# Patient Record
Sex: FEMALE | Race: ASIAN | NOT HISPANIC OR LATINO | URBAN - METROPOLITAN AREA
[De-identification: names, ages, dates, MRNs, and addresses within clinical notes are randomized per-mention and may not be internally consistent; named-entity substitution may affect disease eponyms.]

---

## 2024-08-29 ENCOUNTER — OUTPATIENT (OUTPATIENT)
Dept: OUTPATIENT SERVICES | Facility: HOSPITAL | Age: 74
LOS: 1 days | End: 2024-08-29
Payer: MEDICAID

## 2024-08-29 VITALS
RESPIRATION RATE: 19 BRPM | TEMPERATURE: 98 F | OXYGEN SATURATION: 100 % | DIASTOLIC BLOOD PRESSURE: 78 MMHG | HEIGHT: 56 IN | HEART RATE: 55 BPM | SYSTOLIC BLOOD PRESSURE: 152 MMHG | WEIGHT: 102.29 LBS

## 2024-08-29 VITALS
RESPIRATION RATE: 23 BRPM | SYSTOLIC BLOOD PRESSURE: 137 MMHG | OXYGEN SATURATION: 98 % | HEART RATE: 58 BPM | DIASTOLIC BLOOD PRESSURE: 75 MMHG

## 2024-08-29 DIAGNOSIS — H25.89 OTHER AGE-RELATED CATARACT: ICD-10-CM

## 2024-08-29 PROCEDURE — C1889: CPT

## 2024-08-29 PROCEDURE — 66984 XCAPSL CTRC RMVL W/O ECP: CPT | Mod: RT

## 2024-08-29 DEVICE — LASER PROBE 25G CONSTELLATION: Type: IMPLANTABLE DEVICE | Site: RIGHT | Status: FUNCTIONAL

## 2024-08-29 RX ORDER — METOPROLOL TARTRATE 100 MG/1
1 TABLET ORAL
Refills: 0 | DISCHARGE

## 2024-08-29 RX ORDER — AMLODIPINE BESYLATE 10 MG/1
1 TABLET ORAL
Refills: 0 | DISCHARGE

## 2024-08-29 RX ORDER — DONEPEZIL HYDROCHLORIDE 5 MG/1
1 TABLET, FILM COATED ORAL
Refills: 0 | DISCHARGE

## 2024-08-29 NOTE — ASU DISCHARGE PLAN (ADULT/PEDIATRIC) - ASU DC SPECIAL INSTRUCTIONSFT
Continue Home Medication Regimen as per your Primary Care Provider    Keep patch and shield on    Follow up in the Retina Surgeon's office tomorrow

## 2024-08-29 NOTE — ASU DISCHARGE PLAN (ADULT/PEDIATRIC) - CARE PROVIDER_API CALL
Evan Washington  Ophthalmology  [address] [place] [address], [address]  [place] [address]  Phone: [phone]  Fax: [phone]  Scheduled Appointment: 08/30/2024 09:30 AM

## 2024-11-14 ENCOUNTER — APPOINTMENT (OUTPATIENT)
Dept: OPHTHALMOLOGY | Facility: CLINIC | Age: 74
End: 2024-11-14

## 2024-12-18 PROBLEM — F03.90 UNSPECIFIED DEMENTIA, UNSPECIFIED SEVERITY, WITHOUT BEHAVIORAL DISTURBANCE, PSYCHOTIC DISTURBANCE, MOOD DISTURBANCE, AND ANXIETY: Chronic | Status: ACTIVE | Noted: 2024-08-29

## 2024-12-18 PROBLEM — I10 ESSENTIAL (PRIMARY) HYPERTENSION: Chronic | Status: ACTIVE | Noted: 2024-08-29

## 2025-01-02 ENCOUNTER — OUTPATIENT (OUTPATIENT)
Dept: OUTPATIENT SERVICES | Facility: HOSPITAL | Age: 75
LOS: 1 days | End: 2025-01-02
Payer: MEDICAID

## 2025-01-02 VITALS
TEMPERATURE: 98 F | DIASTOLIC BLOOD PRESSURE: 70 MMHG | HEART RATE: 62 BPM | SYSTOLIC BLOOD PRESSURE: 144 MMHG | WEIGHT: 106.26 LBS | HEIGHT: 55.5 IN | OXYGEN SATURATION: 100 % | RESPIRATION RATE: 18 BRPM

## 2025-01-02 VITALS
OXYGEN SATURATION: 99 % | RESPIRATION RATE: 15 BRPM | DIASTOLIC BLOOD PRESSURE: 61 MMHG | SYSTOLIC BLOOD PRESSURE: 119 MMHG | HEART RATE: 82 BPM

## 2025-01-02 DIAGNOSIS — Z98.49 CATARACT EXTRACTION STATUS, UNSPECIFIED EYE: Chronic | ICD-10-CM

## 2025-01-02 DIAGNOSIS — H25.89 OTHER AGE-RELATED CATARACT: ICD-10-CM

## 2025-01-02 PROCEDURE — 66986 EXCHANGE LENS PROSTHESIS: CPT | Mod: LT

## 2025-01-02 PROCEDURE — 67036 REMOVAL OF INNER EYE FLUID: CPT | Mod: AS,LT

## 2025-01-02 PROCEDURE — 67036 REMOVAL OF INNER EYE FLUID: CPT | Mod: LT

## 2025-01-02 PROCEDURE — V2632: CPT

## 2025-01-02 DEVICE — IMPLANTABLE DEVICE: Type: IMPLANTABLE DEVICE | Site: LEFT | Status: FUNCTIONAL

## 2025-01-02 DEVICE — LASER PROBE 25G CONSTELLATION: Type: IMPLANTABLE DEVICE | Site: LEFT | Status: FUNCTIONAL

## 2025-01-02 NOTE — ASU PATIENT PROFILE, ADULT - FALL HARM RISK - ATTEMPT OOB
R: [place]  Access Inpatient Bed Call Log 7/12/24 @0878 Intake has called facilities that have not updated the bed status within the last 12 hours.                                 [facility] is posting 0 beds.           [facility] is posting 0 beds. [phone]; per call at 7:06 am to [place], they are at cap.     [facility] is posting 0 beds. Negative covid required    [facility] is posting 0 beds. Neg covid. No high school/[employer]. [phone]. .     [facility] is posting 0 beds. [phone]    [facility] is posting 0 beds. [phone]     [facility] is posting 2 beds. Ages 18-35. Negative covid. [phone].    Per call 0800  they have 0  [facility] is posting 0 beds.     [facility] is posting 0 beds [phone]          Pt remains on the work list pending appropriate bed availability.             No

## 2025-01-02 NOTE — ASU PATIENT PROFILE, ADULT - LEARNING ASSESSMENT (PATIENT) ADDITIONAL COMMENTS
Readiness to learn , Via [employer]. Readiness to learn , Via [employer] with  son Arjun

## 2025-01-02 NOTE — ASU DISCHARGE PLAN (ADULT/PEDIATRIC) - CARE PROVIDER_API CALL
Evan Washington  Ophthalmology  [address] [place] [address], [address]  [place] [address]  Phone: [phone]  Fax: [phone]  Scheduled Appointment: 01/03/2025   Evan Washington  Ophthalmology  [address] [place] [address], [address]  [place] [address]  Phone: [phone]  Fax: [phone]  Scheduled Appointment: 01/03/2025 09:15 AM

## 2025-01-02 NOTE — ASU PREOP CHECKLIST - LAST TOOK
solids
I personally performed the service described in the documentation  recorded by the scribe in my presence, and it accurately and completely records my words and action.

## 2025-01-02 NOTE — ASU PREOP CHECKLIST - 1.
patient speaks Chinese a different dialect, but was able to give permission for her son Arjun to translate , Via [employer].

## 2025-01-02 NOTE — ASU DISCHARGE PLAN (ADULT/PEDIATRIC) - NS MD DC FALL RISK RISK
For information on Fall & Injury Prevention, visit: [email or web address] OR  [email or web address] OR  https://www.cdc.gov/steadi/patient.html

## 2025-01-02 NOTE — ASU DISCHARGE PLAN (ADULT/PEDIATRIC) - PROVIDER TOKENS
PROVIDER:[TOKEN:[[id number]],SCHEDULEDAPPT:[01/03/2025]] PROVIDER:[TOKEN:[[id number]],SCHEDULEDAPPT:[01/03/2025],SCHEDULEDAPPTTIME:[09:15 AM]]

## 2025-01-02 NOTE — ASU PATIENT PROFILE, ADULT - FALL HARM RISK - HARM RISK INTERVENTIONS

## 2025-01-02 NOTE — ASU PREOP CHECKLIST - BETA DATE TIME
Detail Level: Detailed
Other (Free Text): Please refer to the attached body diagrams for complete details.
01-Jan-2025 14:00

## 2025-01-02 NOTE — ASU PATIENT PROFILE, ADULT - NS TRANSFER PATIENT BELONGINGS
Anab[employer] wood necklace, patient request it not to be removed, will make surgeoand operating team aware, to see if it is ok for patient to keep it on./Jewelry/Money (specify)

## 2025-01-02 NOTE — ASU PATIENT PROFILE, ADULT - HEALTHCARE QUESTIONS, PROFILE
will speak with surgeon & anesthesia prior to surgery , Via [employer]. will speak with surgeon & anesthesia prior to surgery , Via [employer],  with  son Arjun

## 2025-01-02 NOTE — ASU PATIENT PROFILE, ADULT - NSICDXPASTSURGICALHX_GEN_ALL_CORE_FT
PAST SURGICAL HISTORY:  No significant past surgical history PAST SURGICAL HISTORY:  S/P cataract surgery Right eye.

## 2025-01-02 NOTE — ASU DISCHARGE PLAN (ADULT/PEDIATRIC) - FINANCIAL ASSISTANCE
[facility] provides services at a reduced cost to those who are determined to be eligible through [facility]’s financial assistance program. Information regarding [facility]’s financial assistance program can be found by going to [email or web address] or by calling [phone].

## 2025-01-02 NOTE — ASU PREOP CHECKLIST - SELECT TESTS ORDERED
Called and spoke  with patient daughter in reference to her message. I advise Myla (patient daughter), that Dr. Arias is out but is going thru her messages. Once I hear back from the Dr. Arias in reference to  medication I will let her know.    EKG

## 2025-01-02 NOTE — ASU PATIENT PROFILE, ADULT - VISION (WITH CORRECTIVE LENSES IF THE PATIENT USUALLY WEARS THEM):
Partially impaired: cannot see medication labels or newsprint, but can see obstacles in path, and the surrounding layout; can count fingers at arm's length Left eye poor vision./Partially impaired: cannot see medication labels or newsprint, but can see obstacles in path, and the surrounding layout; can count fingers at arm's length

## 2025-03-17 ENCOUNTER — NON-APPOINTMENT (OUTPATIENT)
Age: 75
End: 2025-03-17

## 2025-03-17 ENCOUNTER — APPOINTMENT (OUTPATIENT)
Dept: OPHTHALMOLOGY | Facility: CLINIC | Age: 75
End: 2025-03-17
Payer: MEDICAID

## 2025-03-17 PROCEDURE — 92014 COMPRE OPH EXAM EST PT 1/>: CPT

## 2025-03-17 PROCEDURE — 92134 CPTRZ OPH DX IMG PST SGM RTA: CPT

## 2025-05-12 ENCOUNTER — NON-APPOINTMENT (OUTPATIENT)
Age: 75
End: 2025-05-12

## 2025-05-12 ENCOUNTER — APPOINTMENT (OUTPATIENT)
Dept: OPHTHALMOLOGY | Facility: CLINIC | Age: 75
End: 2025-05-12
Payer: MEDICAID

## 2025-05-12 PROCEDURE — 66821 AFTER CATARACT LASER SURGERY: CPT | Mod: LT

## 2025-06-05 ENCOUNTER — APPOINTMENT (OUTPATIENT)
Dept: OPHTHALMOLOGY | Facility: CLINIC | Age: 75
End: 2025-06-05

## 2025-06-05 ENCOUNTER — NON-APPOINTMENT (OUTPATIENT)
Age: 75
End: 2025-06-05

## 2025-06-05 PROCEDURE — 66821 AFTER CATARACT LASER SURGERY: CPT | Mod: 79,RT

## 2025-07-03 ENCOUNTER — NON-APPOINTMENT (OUTPATIENT)
Age: 75
End: 2025-07-03

## 2025-07-03 ENCOUNTER — APPOINTMENT (OUTPATIENT)
Dept: OPHTHALMOLOGY | Facility: CLINIC | Age: 75
End: 2025-07-03

## 2025-07-03 PROCEDURE — 99024 POSTOP FOLLOW-UP VISIT: CPT

## (undated) DEVICE — SUT GORETEX CV-8 (7-0) 30" PT-9

## (undated) DEVICE — PACK VITRECTOMY

## (undated) DEVICE — KNFE VITREC 20G

## (undated) DEVICE — KNIFE OPTH SHARPOINT CRESCENT 2MM

## (undated) DEVICE — WARMING BLANKET LOWER ADULT

## (undated) DEVICE — SUT VICRYL 7-0 12" TG140-8 DA

## (undated) DEVICE — NDL HYPO SAFE 22G X 1.5" (BLACK)

## (undated) DEVICE — CANNULA ALCON PROVISC 27G THREADED HUB

## (undated) DEVICE — SUT NYLON 10-0 12" CU-5

## (undated) DEVICE — GLV 7.5 PROTEXIS (WHITE)

## (undated) DEVICE — LIGHT SHIELD CORNEAL

## (undated) DEVICE — CONSTELLATION TOTAL PLUS PAK 23G

## (undated) DEVICE — SYSTEM ENTRY OPHTHALMIC VALVED 23G

## (undated) DEVICE — SYSTEM ENTRY OPHTHALMIC VALVED 25G

## (undated) DEVICE — CANNULA ALCON SOFT TIP 25G

## (undated) DEVICE — SYE-LASER - CONSTELLATION MACHINE #2 1003466901X: Type: DURABLE MEDICAL EQUIPMENT

## (undated) DEVICE — ELCTR ERASER BI-P BVL 45DEG 18G

## (undated) DEVICE — VENODYNE/SCD SLEEVE CALF MEDIUM

## (undated) DEVICE — DRAPE OPHTHALMIC W POUCH

## (undated) DEVICE — SUT VICRYL 10-0 12" CS160-6 DA

## (undated) DEVICE — CONSTELLATION FRAGMENTATION PAK

## (undated) DEVICE — ELCTR BIPOLAR CORD J&J 12FT DISP

## (undated) DEVICE — SUT VICRYL 8-0 12" TG140-8 DA

## (undated) DEVICE — SOL IRR BAL SALT + 500ML

## (undated) DEVICE — KNIFE SIDEPORT ANG W/ SAFETY 3MM

## (undated) DEVICE — ELCTR BVI ACCU-TEMP CAUTERY SHAFT FINE TIP 1/2"

## (undated) DEVICE — FORCEP GRIESHABER SERRATED 25G DISP

## (undated) DEVICE — SOL BALANCE SALT 15ML

## (undated) DEVICE — KNFE OPTH SLIT ANG 3MM

## (undated) DEVICE — KNIFE ALCON CRESCENT STRAIGHT BEVEL UP 2.3MM (PINK)

## (undated) DEVICE — SUT VICRYL 10-0 12" CS160-8 DA

## (undated) DEVICE — PACK CONSTELLATION POST 25G 20K